# Patient Record
Sex: MALE | Race: WHITE | NOT HISPANIC OR LATINO
[De-identification: names, ages, dates, MRNs, and addresses within clinical notes are randomized per-mention and may not be internally consistent; named-entity substitution may affect disease eponyms.]

---

## 2019-01-29 ENCOUNTER — NON-APPOINTMENT (OUTPATIENT)
Age: 74
End: 2019-01-29

## 2019-01-29 ENCOUNTER — APPOINTMENT (OUTPATIENT)
Dept: CARDIOLOGY | Facility: CLINIC | Age: 74
End: 2019-01-29
Payer: MEDICARE

## 2019-01-29 VITALS — OXYGEN SATURATION: 96 % | SYSTOLIC BLOOD PRESSURE: 144 MMHG | DIASTOLIC BLOOD PRESSURE: 82 MMHG | HEART RATE: 91 BPM

## 2019-01-29 PROCEDURE — 99204 OFFICE O/P NEW MOD 45 MIN: CPT

## 2019-01-29 PROCEDURE — 93000 ELECTROCARDIOGRAM COMPLETE: CPT

## 2019-01-29 RX ORDER — METOPROLOL SUCCINATE 25 MG/1
25 TABLET, EXTENDED RELEASE ORAL
Refills: 0 | Status: ACTIVE | COMMUNITY

## 2019-01-29 RX ORDER — FINASTERIDE 5 MG/1
5 TABLET, FILM COATED ORAL
Refills: 0 | Status: ACTIVE | COMMUNITY

## 2019-01-29 RX ORDER — TADALAFIL 5 MG/1
5 TABLET, FILM COATED ORAL
Refills: 0 | Status: ACTIVE | COMMUNITY

## 2019-01-29 NOTE — DISCUSSION/SUMMARY
[FreeTextEntry1] : Pt is a 72 y/o M who presents today for evaluation.  He has PMH as follows:\par afib s/p ablation x2 last one 2013 done at [facility] with Dr Jacob\par echo 11/2016 normal LV function without significant valvular disease\par stress test 12/2016 non-diagnostic - target HR not achieved\par He also notes that he ahd 15% stenosis R sided carotid 10-15 yrs ago\par Will repeat TTE\par will perform ETT to assess patient's current cardiac reserve to incremental activity and check for provocable ECG changes.\par Repeat CUS\par HTN: well controlled, c/w current meds, advised ow salt diet, regular exercise, weight loss\par HLD: c/w statin. Advised lifestyle modifications\par Will try to get lab reports from PCP\par The described plan was discussed with the pt.  All questions and concerns were addressed to the best of my knowledge.

## 2019-01-29 NOTE — HISTORY OF PRESENT ILLNESS
[FreeTextEntry1] : Pt is a 72 y/o M who is referred here today by their PCP for evaluation.  He has PMH as follows:\par afib s/p ablation x2 last one 2013 done at [facility] with Dr Jacob\par echo 11/2016 normal LV function without significant valvular disease\par stress test 12/2016 non-diagnostic - target HR not achieved\par He also notes that he ahd 15% stenosis R sided carotid 10-15 yrs ago\par Tells me that he is feeling well overall and has no complaints.  He denies CP, SOB, diaphoresis, palpitations, dizziness, syncope, LE edema, PND. \par States that he is taking statin and diabetic medication (victoza) to help him lose weight - cannot not remember name of statin\par \par PCP: Dr Liang\par Works at a [employer]\par PMH: afib s/p ablation x2, HTN, HLD\par Smoking status: never\par Etoh socially\par no drug use\par Current exercise: none outside of work\par Daily water intake: 64oz\par Daily caffeine intake: 2 cups coffee\par OTC medications: none \par Family hx: father DM/CHF\par Previous cardiac testing: as mentioned\par Previous hospitalizations: ablation, splenectomy 1973

## 2019-01-29 NOTE — REASON FOR VISIT
[Consultation] : a consultation regarding [Atrial Fibrillation] : atrial fibrillation [Hyperlipidemia] : hyperlipidemia [Hypertension] : hypertension

## 2019-02-07 ENCOUNTER — APPOINTMENT (OUTPATIENT)
Dept: CARDIOLOGY | Facility: CLINIC | Age: 74
End: 2019-02-07
Payer: MEDICARE

## 2019-02-07 PROCEDURE — 93880 EXTRACRANIAL BILAT STUDY: CPT | Mod: 26

## 2019-02-11 ENCOUNTER — APPOINTMENT (OUTPATIENT)
Dept: CARDIOLOGY | Facility: CLINIC | Age: 74
End: 2019-02-11
Payer: MEDICARE

## 2019-02-11 PROCEDURE — 93306 TTE W/DOPPLER COMPLETE: CPT

## 2019-02-11 RX ADMIN — PERFLUTREN MG/ML: 6.52 INJECTION, SUSPENSION INTRAVENOUS at 00:00

## 2019-02-12 RX ORDER — PERFLUTREN 6.52 MG/ML
6.52 INJECTION, SUSPENSION INTRAVENOUS
Qty: 1 | Refills: 0 | Status: COMPLETED | OUTPATIENT
Start: 2019-02-11

## 2019-02-12 RX ORDER — ASPIRIN 81 MG
81 TABLET, DELAYED RELEASE (ENTERIC COATED) ORAL
Refills: 0 | Status: ACTIVE | COMMUNITY

## 2019-02-19 ENCOUNTER — APPOINTMENT (OUTPATIENT)
Dept: CARDIOLOGY | Facility: CLINIC | Age: 74
End: 2019-02-19

## 2019-02-25 ENCOUNTER — OTHER (OUTPATIENT)
Age: 74
End: 2019-02-25

## 2019-02-25 ENCOUNTER — APPOINTMENT (OUTPATIENT)
Dept: CARDIOLOGY | Facility: CLINIC | Age: 74
End: 2019-02-25
Payer: MEDICARE

## 2019-02-25 PROCEDURE — 93015 CV STRESS TEST SUPVJ I&R: CPT

## 2020-02-14 ENCOUNTER — NON-APPOINTMENT (OUTPATIENT)
Age: 75
End: 2020-02-14

## 2020-02-14 ENCOUNTER — APPOINTMENT (OUTPATIENT)
Dept: CARDIOLOGY | Facility: CLINIC | Age: 75
End: 2020-02-14
Payer: MEDICARE

## 2020-02-14 VITALS
WEIGHT: 308 LBS | BODY MASS INDEX: 36.37 KG/M2 | SYSTOLIC BLOOD PRESSURE: 120 MMHG | HEIGHT: 77 IN | HEART RATE: 73 BPM | OXYGEN SATURATION: 97 % | DIASTOLIC BLOOD PRESSURE: 72 MMHG

## 2020-02-14 PROCEDURE — 99213 OFFICE O/P EST LOW 20 MIN: CPT | Mod: 25

## 2020-02-14 PROCEDURE — 93000 ELECTROCARDIOGRAM COMPLETE: CPT

## 2020-02-15 NOTE — PHYSICAL EXAM
[Normal Appearance] : normal appearance [General Appearance - Well Developed] : well developed [No Deformities] : no deformities [Well Groomed] : well groomed [General Appearance - Well Nourished] : well nourished [Normal Conjunctiva] : the conjunctiva exhibited no abnormalities [General Appearance - In No Acute Distress] : no acute distress [No Oral Pallor] : no oral pallor [Eyelids - No Xanthelasma] : the eyelids demonstrated no xanthelasmas [Normal Oral Mucosa] : normal oral mucosa [No Oral Cyanosis] : no oral cyanosis [Respiration, Rhythm And Depth] : normal respiratory rhythm and effort [Exaggerated Use Of Accessory Muscles For Inspiration] : no accessory muscle use [Heart Sounds] : normal S1 and S2 [Heart Rate And Rhythm] : heart rate and rhythm were normal [Auscultation Breath Sounds / Voice Sounds] : lungs were clear to auscultation bilaterally [Arterial Pulses Normal] : the arterial pulses were normal [Edema] : no peripheral edema present [Murmurs] : no murmurs present [Abdomen Tenderness] : non-tender [Abdomen Soft] : soft [Abnormal Walk] : normal gait [Abdomen Mass (___ Cm)] : no abdominal mass palpated [Nail Clubbing] : no clubbing of the fingernails [Gait - Sufficient For Exercise Testing] : the gait was sufficient for exercise testing [Cyanosis, Localized] : no localized cyanosis [Petechial Hemorrhages (___cm)] : no petechial hemorrhages [Oriented To Time, Place, And Person] : oriented to person, place, and time [] : no ischemic changes [Impaired Insight] : insight and judgment were intact [Mood] : the mood was normal [Affect] : the affect was normal [No Anxiety] : not feeling anxious [FreeTextEntry1] : no JVD or bruits

## 2020-02-15 NOTE — DISCUSSION/SUMMARY
[FreeTextEntry1] : Pt is a 75 y/o M who is referred here today by their PCP for evaluation.  He has PMH as follows:\par afib s/p ablation x2 last one 2013 done at [facility] with Dr Jacob\par echo 11/2016 normal LV function without significant valvular disease\par TTE 02/2019 EF 60-65%, mild PI\par stress test 12/2016 non-diagnostic - target HR not achieved\par TTE 02/2019 excellent exercise tolerance, no ischemic changes\par CUS 02/2019 mild plaque b/l\par HTN: well controlled, c/w current meds, advised ow salt diet, regular exercise, weight loss\par HLD: c/w statin. Advised lifestyle modifications\par Will try to get lab reports from PCP\par The described plan was discussed with the pt.  All questions and concerns were addressed to the best of my knowledge.

## 2020-02-15 NOTE — HISTORY OF PRESENT ILLNESS
[FreeTextEntry1] : Pt is a 73 y/o M who is referred here today by their PCP for evaluation.  He has PMH as follows:\par afib s/p ablation x2 last one 2013 done at [facility] with Dr Jacob\par echo 11/2016 normal LV function without significant valvular disease\par TTE 02/2019 EF 60-65%, mild PI\par stress test 12/2016 non-diagnostic - target HR not achieved\par TTE 02/2019 excellent exercise tolerance, no ischemic changes\par CUS 02/2019 mild plaque b/l\par He also notes that he  had 15% stenosis R sided carotid 10-15 yrs ago\par Tells me that he is feeling well overall and has no complaints.  He denies CP, SOB, diaphoresis, palpitations, dizziness, syncope, LE edema, PND. \par States that he is taking statin and diabetic medication (victoza) to help him lose weight - cannot not remember name of statin\par \par PCP: Dr Liang\par Works at a [employer]\par PMH: afib s/p ablation x2, HTN, HLD\par Smoking status: never\par Etoh socially\par no drug use\par Current exercise: none outside of work\par Daily water intake: 64oz\par Daily caffeine intake: 2 cups coffee\par OTC medications: none \par Family hx: father DM/CHF\par Previous cardiac testing: as mentioned\par Previous hospitalizations: ablation, splenectomy 1973

## 2020-03-24 ENCOUNTER — NON-APPOINTMENT (OUTPATIENT)
Age: 75
End: 2020-03-24

## 2021-02-16 ENCOUNTER — NON-APPOINTMENT (OUTPATIENT)
Age: 76
End: 2021-02-16

## 2021-02-16 ENCOUNTER — APPOINTMENT (OUTPATIENT)
Dept: CARDIOLOGY | Facility: CLINIC | Age: 76
End: 2021-02-16
Payer: MEDICARE

## 2021-02-16 VITALS
HEART RATE: 85 BPM | SYSTOLIC BLOOD PRESSURE: 140 MMHG | WEIGHT: 315 LBS | BODY MASS INDEX: 37.19 KG/M2 | OXYGEN SATURATION: 96 % | DIASTOLIC BLOOD PRESSURE: 70 MMHG | HEIGHT: 77 IN

## 2021-02-16 DIAGNOSIS — Z98.890 OTHER SPECIFIED POSTPROCEDURAL STATES: ICD-10-CM

## 2021-02-16 PROCEDURE — 93000 ELECTROCARDIOGRAM COMPLETE: CPT

## 2021-02-16 PROCEDURE — 99213 OFFICE O/P EST LOW 20 MIN: CPT | Mod: 25

## 2021-02-16 RX ORDER — LATANOPROST/PF 0.005 %
0.01 DROPS OPHTHALMIC (EYE)
Qty: 2 | Refills: 0 | Status: ACTIVE | COMMUNITY
Start: 2021-02-03

## 2021-02-16 RX ORDER — POTASSIUM CHLORIDE 750 MG/1
10 TABLET, FILM COATED, EXTENDED RELEASE ORAL
Qty: 90 | Refills: 0 | Status: ACTIVE | COMMUNITY
Start: 2021-01-19

## 2021-02-16 RX ORDER — PEN NEEDLE, DIABETIC 32 GX 1/6"
32G X 4 MM NEEDLE, DISPOSABLE MISCELLANEOUS
Qty: 100 | Refills: 0 | Status: ACTIVE | COMMUNITY
Start: 2021-01-12

## 2021-02-16 RX ORDER — LIRAGLUTIDE 6 MG/ML
18 INJECTION SUBCUTANEOUS
Refills: 0 | Status: DISCONTINUED | COMMUNITY
Start: 2020-02-14 | End: 2021-02-16

## 2021-02-16 RX ORDER — DORZOLAMIDE HYDROCHLORIDE TIMOLOL MALEATE 20; 5 MG/ML; MG/ML
22.3-6.8 SOLUTION/ DROPS OPHTHALMIC
Qty: 10 | Refills: 0 | Status: ACTIVE | COMMUNITY
Start: 2020-10-24

## 2021-02-16 NOTE — DISCUSSION/SUMMARY
[FreeTextEntry1] : Pt is a 74 y/o M PMH afib s/p ablation x2 last one 2013 done at [facility] with Dr Jacob, mild b/l carotid plaque\par Tells me that he is feeling well overall and has no complaints.  He denies CP, SOB, diaphoresis, palpitations, dizziness, syncope, LE edema, PND, orthopnea. \par \par TTE 11/2016 normal LV function without significant valvular disease\par TTE 02/2019 EF 60-65%, mild PI\par stress test 12/2016 non-diagnostic - target HR not achieved\par ETT 02/2019 excellent exercise tolerance, no ischemic changes\par CUS 02/2019 mild plaque b/l\par \par HTN: well controlled, c/w current meds, advised ow salt diet, regular exercise, weight loss\par HLD: not currently on a statin - will try to get most recent lab results\par Will try to get lab reports from PCP\par Pt will return in 12 mnths or sooner as needed but I encouraged communication via phone or portal if necessary. \par The described plan was discussed with the pt.  All questions and concerns were addressed to the best of my knowledge.

## 2021-02-16 NOTE — HISTORY OF PRESENT ILLNESS
[FreeTextEntry1] : Pt is a 76 y/o M PMH afib s/p ablation x2 last one 2013 done at [facility] with Dr Jacob, mild b/l carotid plaque\par Tells me that he is feeling well overall and has no complaints.  He denies CP, SOB, diaphoresis, palpitations, dizziness, syncope, LE edema, PND, orthopnea. \par \par TTE 11/2016 normal LV function without significant valvular disease\par TTE 02/2019 EF 60-65%, mild PI\par stress test 12/2016 non-diagnostic - target HR not achieved\par ETT 02/2019 excellent exercise tolerance, no ischemic changes\par CUS 02/2019 mild plaque b/l\par \par COVID vaccine 02/2021\par \par PCP: Dr Liang\par Works at a [employer]\par PMH: afib s/p ablation x2, HTN, HLD\par Smoking status: never\par Etoh socially\par no drug use\par Current exercise: none outside of work\par Daily water intake: 64oz\par Daily caffeine intake: 2 cups coffee\par OTC medications: none \par Family hx: father DM/CHF\par Previous cardiac testing: as mentioned\par Previous hospitalizations: ablation, splenectomy 1973

## 2021-02-16 NOTE — REVIEW OF SYSTEMS
[see HPI] : see HPI [Negative] : Heme/Lymph [Shortness Of Breath] : no shortness of breath [Dyspnea on exertion] : not dyspnea during exertion [Chest  Pressure] : no chest pressure [Chest Pain] : no chest pain [Leg Claudication] : no intermittent leg claudication [Lower Ext Edema] : no extremity edema [Palpitations] : no palpitations

## 2021-02-16 NOTE — PHYSICAL EXAM
[General Appearance - Well Developed] : well developed [Normal Appearance] : normal appearance [Well Groomed] : well groomed [General Appearance - Well Nourished] : well nourished [No Deformities] : no deformities [General Appearance - In No Acute Distress] : no acute distress [Normal Conjunctiva] : the conjunctiva exhibited no abnormalities [Eyelids - No Xanthelasma] : the eyelids demonstrated no xanthelasmas [Normal Oral Mucosa] : normal oral mucosa [No Oral Pallor] : no oral pallor [No Oral Cyanosis] : no oral cyanosis [Respiration, Rhythm And Depth] : normal respiratory rhythm and effort [Exaggerated Use Of Accessory Muscles For Inspiration] : no accessory muscle use [Auscultation Breath Sounds / Voice Sounds] : lungs were clear to auscultation bilaterally [Heart Rate And Rhythm] : heart rate and rhythm were normal [Heart Sounds] : normal S1 and S2 [Murmurs] : no murmurs present [Arterial Pulses Normal] : the arterial pulses were normal [Edema] : no peripheral edema present [Abdomen Soft] : soft [Abdomen Tenderness] : non-tender [Abdomen Mass (___ Cm)] : no abdominal mass palpated [Abnormal Walk] : normal gait [Gait - Sufficient For Exercise Testing] : the gait was sufficient for exercise testing [Nail Clubbing] : no clubbing of the fingernails [Cyanosis, Localized] : no localized cyanosis [Petechial Hemorrhages (___cm)] : no petechial hemorrhages [] : no ischemic changes [Oriented To Time, Place, And Person] : oriented to person, place, and time [Affect] : the affect was normal [Impaired Insight] : insight and judgment were intact [Mood] : the mood was normal [No Anxiety] : not feeling anxious [FreeTextEntry1] : no JVD or bruits

## 2022-05-10 ENCOUNTER — NON-APPOINTMENT (OUTPATIENT)
Age: 77
End: 2022-05-10

## 2022-09-06 ENCOUNTER — APPOINTMENT (OUTPATIENT)
Dept: CARDIOLOGY | Facility: CLINIC | Age: 77
End: 2022-09-06

## 2022-09-06 ENCOUNTER — NON-APPOINTMENT (OUTPATIENT)
Age: 77
End: 2022-09-06

## 2022-09-06 VITALS
BODY MASS INDEX: 37.19 KG/M2 | HEIGHT: 77 IN | HEART RATE: 74 BPM | SYSTOLIC BLOOD PRESSURE: 142 MMHG | DIASTOLIC BLOOD PRESSURE: 76 MMHG | WEIGHT: 315 LBS

## 2022-09-06 DIAGNOSIS — I65.21 OCCLUSION AND STENOSIS OF RIGHT CAROTID ARTERY: ICD-10-CM

## 2022-09-06 PROCEDURE — 99214 OFFICE O/P EST MOD 30 MIN: CPT | Mod: 25

## 2022-09-06 NOTE — HISTORY OF PRESENT ILLNESS
[FreeTextEntry1] : Pt is a 75 y/o M PMH afib s/p ablation x2 last one 2013 done at [facility] with Dr Jacob on ASA, HTN, HLD, mild b/l carotid plaque.  He tells me that he went to the eye Dr recently who was concerned about carotid stenosis\par Pt reports feeling well and has no active cardiac complaints - denies CP, SOB, palpitations, dizziness, syncope, edema, orthopnea, PND, orthopnea.  No exertional symptoms. \par \par TTE 11/2016 normal LV function without significant valvular disease\par TTE 02/2019 EF 60-65%, mild PI\par stress test 12/2016 non-diagnostic - target HR not achieved\par ETT 02/2019 excellent exercise tolerance, no ischemic changes\par CUS 02/2019 mild plaque b/l\par \par COVID vaccine 02/2021\par \par PCP: Dr Liang\par Works at a nursery\par PMH: afib s/p ablation x2, HTN, HLD\par Smoking status: never\par Etoh socially\par no drug use\par Current exercise: none outside of work\par Daily water intake: 64oz\par Daily caffeine intake: 2 cups coffee\par OTC medications: none \par Family hx: father DM/CHF\par Previous hospitalizations: ablation, splenectomy 1973

## 2022-09-06 NOTE — DISCUSSION/SUMMARY
[FreeTextEntry1] : Pt is a 77 y/o M PMH afib s/p ablation x2 last one 2013 done at [facility] with Dr Jacob, HTN, HLD, mild b/l carotid plaque\par \par TTE 11/2016 normal LV function without significant valvular disease\par TTE 02/2019 EF 60-65%, mild PI\par stress test 12/2016 non-diagnostic - target HR not achieved\par ETT 02/2019 excellent exercise tolerance, no ischemic changes\par CUS 02/2019 mild plaque b/l\par \par AF:\par pt very symptomatic with his episodes and has been feeling well since his last ablation\par c/w ASA\par monitor closely\par \par ROBERT:\par repeat CUS\par c/w ASA\par will try to gert copy of most recent chol levels\par \par HTN: \par well controlled, \par c/w current meds, \par advised ow salt diet, regular exercise, weight loss\par \par HLD: \par not currently on a statin - will try to get most recent lab results\par Will try to get lab reports from PCP\par \par Pt will return in 6 mnths or sooner as needed but I encouraged communication via phone or portal if necessary. \par The described plan was discussed with the pt.  All questions and concerns were addressed to the best of my knowledge.

## 2022-09-08 ENCOUNTER — APPOINTMENT (OUTPATIENT)
Dept: CARDIOLOGY | Facility: CLINIC | Age: 77
End: 2022-09-08

## 2022-09-08 PROCEDURE — 93880 EXTRACRANIAL BILAT STUDY: CPT | Mod: 26

## 2022-10-13 ENCOUNTER — NON-APPOINTMENT (OUTPATIENT)
Age: 77
End: 2022-10-13

## 2022-11-10 ENCOUNTER — APPOINTMENT (OUTPATIENT)
Dept: CARDIOLOGY | Facility: CLINIC | Age: 77
End: 2022-11-10

## 2023-04-06 ENCOUNTER — APPOINTMENT (OUTPATIENT)
Dept: CARDIOLOGY | Facility: CLINIC | Age: 78
End: 2023-04-06
Payer: MEDICARE

## 2023-04-06 ENCOUNTER — NON-APPOINTMENT (OUTPATIENT)
Age: 78
End: 2023-04-06

## 2023-04-06 VITALS
HEIGHT: 77 IN | WEIGHT: 315 LBS | DIASTOLIC BLOOD PRESSURE: 74 MMHG | OXYGEN SATURATION: 97 % | SYSTOLIC BLOOD PRESSURE: 128 MMHG | BODY MASS INDEX: 37.19 KG/M2 | HEART RATE: 73 BPM

## 2023-04-06 PROCEDURE — 99214 OFFICE O/P EST MOD 30 MIN: CPT | Mod: 25

## 2023-04-06 PROCEDURE — 93000 ELECTROCARDIOGRAM COMPLETE: CPT

## 2023-04-06 NOTE — DISCUSSION/SUMMARY
[FreeTextEntry1] : Pt is a 76 y/o M PMH afib s/p ablation x2 last one 2013 done at [facility] with Dr Jacob, HTN, HLD, mild b/l carotid plaque\par \par TTE 11/2016 normal LV function without significant valvular disease\par TTE 02/2019 EF 60-65%, mild PI\par stress test 12/2016 non-diagnostic - target HR not achieved\par ETT 02/2019 excellent exercise tolerance, no ischemic changes\par CUS 02/2019 mild plaque b/l\par \par AF:\par pt very symptomatic with his episodes and has been feeling well since his last ablation\par c/w ASA\par monitor closely\par \par ROBERT:\par c/w ASA\par will try to get copy of most recent chol levels\par \par HTN: \par well controlled, \par c/w current meds, \par advised ow salt diet, regular exercise, weight loss\par \par HLD: \par not currently on a statin - will try to get most recent lab results\par Will try to get lab reports from PCP\par \par Pt will return in 12 mnths or sooner as needed but I encouraged communication via phone or portal if necessary. \par The described plan was discussed with the pt.  All questions and concerns were addressed to the best of my knowledge.

## 2023-04-06 NOTE — HISTORY OF PRESENT ILLNESS
[FreeTextEntry1] : Pt is a 76 y/o M PMH afib s/p ablation x2 last one 2013 done at [facility] with Dr Jacob on ASA, HTN, HLD, mild b/l carotid plaque. \par Pt reports feeling well and has no active cardiac complaints - denies CP, SOB, palpitations, dizziness, syncope, edema, orthopnea, PND, orthopnea.  No exertional symptoms. \par \par TTE 11/2016 normal LV function without significant valvular disease\par TTE 02/2019 EF 60-65%, mild PI\par stress test 12/2016 non-diagnostic - target HR not achieved\par ETT 02/2019 excellent exercise tolerance, no ischemic changes\par CUS 02/2019 mild plaque b/l\par CUS 09/2022 mild atherosclerosis with normal velocities\par \par PCP: Dr Liang\par Works at a [employer]\par PMH: afib s/p ablation x2, HTN, HLD\par Smoking status: never\par Etoh socially\par no drug use\par Current exercise: none outside of work\par Daily water intake: 64oz\par Daily caffeine intake: 2 cups coffee\par OTC medications: none \par Family hx: father DM/CHF\par Previous hospitalizations: ablation, splenectomy 1973

## 2023-12-05 ENCOUNTER — APPOINTMENT (OUTPATIENT)
Dept: CARDIOLOGY | Facility: CLINIC | Age: 78
End: 2023-12-05
Payer: MEDICARE

## 2023-12-05 ENCOUNTER — NON-APPOINTMENT (OUTPATIENT)
Age: 78
End: 2023-12-05

## 2023-12-05 VITALS
SYSTOLIC BLOOD PRESSURE: 142 MMHG | HEART RATE: 71 BPM | BODY MASS INDEX: 37.19 KG/M2 | WEIGHT: 315 LBS | DIASTOLIC BLOOD PRESSURE: 76 MMHG | HEIGHT: 77 IN | OXYGEN SATURATION: 93 %

## 2023-12-05 DIAGNOSIS — M79.89 OTHER SPECIFIED SOFT TISSUE DISORDERS: ICD-10-CM

## 2023-12-05 PROCEDURE — 99214 OFFICE O/P EST MOD 30 MIN: CPT | Mod: 25

## 2023-12-05 PROCEDURE — 93000 ELECTROCARDIOGRAM COMPLETE: CPT

## 2023-12-22 ENCOUNTER — APPOINTMENT (OUTPATIENT)
Dept: CARDIOLOGY | Facility: CLINIC | Age: 78
End: 2023-12-22
Payer: MEDICARE

## 2023-12-22 PROCEDURE — 93306 TTE W/DOPPLER COMPLETE: CPT

## 2024-04-08 ENCOUNTER — NON-APPOINTMENT (OUTPATIENT)
Age: 79
End: 2024-04-08

## 2024-04-08 ENCOUNTER — APPOINTMENT (OUTPATIENT)
Dept: CARDIOLOGY | Facility: CLINIC | Age: 79
End: 2024-04-08
Payer: MEDICARE

## 2024-04-08 VITALS
BODY MASS INDEX: 36.6 KG/M2 | HEIGHT: 77 IN | OXYGEN SATURATION: 96 % | WEIGHT: 310 LBS | DIASTOLIC BLOOD PRESSURE: 62 MMHG | SYSTOLIC BLOOD PRESSURE: 120 MMHG | HEART RATE: 107 BPM

## 2024-04-08 DIAGNOSIS — I10 ESSENTIAL (PRIMARY) HYPERTENSION: ICD-10-CM

## 2024-04-08 DIAGNOSIS — I48.0 PAROXYSMAL ATRIAL FIBRILLATION: ICD-10-CM

## 2024-04-08 DIAGNOSIS — E78.5 HYPERLIPIDEMIA, UNSPECIFIED: ICD-10-CM

## 2024-04-08 PROCEDURE — 99214 OFFICE O/P EST MOD 30 MIN: CPT | Mod: 25

## 2024-04-08 PROCEDURE — 93242 EXT ECG>48HR<7D RECORDING: CPT

## 2024-04-08 PROCEDURE — 93000 ELECTROCARDIOGRAM COMPLETE: CPT | Mod: 59

## 2024-04-08 RX ORDER — APIXABAN 5 MG/1
5 TABLET, FILM COATED ORAL
Qty: 180 | Refills: 3 | Status: ACTIVE | COMMUNITY
Start: 2019-02-25 | End: 1900-01-01

## 2024-04-08 NOTE — PHYSICAL EXAM

## 2024-04-08 NOTE — DISCUSSION/SUMMARY
[EKG obtained to assist in diagnosis and management of assessed problem(s)] : EKG obtained to assist in diagnosis and management of assessed problem(s) [FreeTextEntry1] : Pt is a 79 y/o M PMH afib s/p ablation x2 last one 2013 done at [facility] with Dr Jacob, HTN, HLD, mild b/l carotid plaque  TTE 11/2016 normal LV function without significant valvular disease TTE 02/2019 EF 60-65%, mild PI TTE 12/2023 EF 55-60%, trace MR/TR stress test 12/2016 non-diagnostic - target HR not achieved ETT 02/2019 excellent exercise tolerance, no ischemic changes CUS 02/2019 mild plaque b/l CUS 09/2022 mild atherosclerosis with normal velocities  AF: ECG shows AF today CHADSVasc = 3 start Eliquis 5mg bid stop ASA  check cueva check CCTA  monitor closely  ROBERT: mild plaque will try to get copy of most recent chol levels  HTN:  well controlled c/w current meds  advised ow salt diet, regular exercise, weight loss  HLD:  not currently on a statin - will try to get most recent lab results Will try to get lab reports from PCP  Pt will return in 6 mnths or sooner as needed but I encouraged communication via phone or portal if necessary.  The described plan was discussed with the pt.  All questions and concerns were addressed to the best of my knowledge.

## 2024-04-08 NOTE — HISTORY OF PRESENT ILLNESS
[FreeTextEntry1] : Pt is a 77 y/o M PMH afib s/p ablation x2 last one 2013 done at [facility] with Dr Jacob on ASA, HTN, HLD, mild b/l carotid plaque.   Pt reports feeling well and has no active cardiac complaints - denies CP, SOB, palpitations, dizziness, syncope, edema, orthopnea, PND, orthopnea.  No exertional symptoms. No new hospitalizations, emergency room/urgent care visits, new medical diagnoses, new medications, surgery, or cardiac testing since last OV.   TTE 11/2016 normal LV function without significant valvular disease TTE 02/2019 EF 60-65%, mild PI TTE 12/2023 EF 55-60%, trace MR/TR stress test 12/2016 non-diagnostic - target HR not achieved ETT 02/2019 excellent exercise tolerance, no ischemic changes CUS 02/2019 mild plaque b/l CUS 09/2022 mild atherosclerosis with normal velocities  PCP: Dr Liang Works at a [employer] PMH: afib s/p ablation x2, HTN, HLD Smoking status: never Etoh socially no drug use Current exercise: none outside of work Daily water intake: 64oz Daily caffeine intake: 2 cups coffee OTC medications: none  Family hx: father DM/CHF Previous hospitalizations: ablation, splenectomy 1973

## 2024-04-16 DIAGNOSIS — R94.31 ABNORMAL ELECTROCARDIOGRAM [ECG] [EKG]: ICD-10-CM

## 2024-04-18 ENCOUNTER — APPOINTMENT (OUTPATIENT)
Dept: CARDIOLOGY | Facility: CLINIC | Age: 79
End: 2024-04-18
Payer: MEDICARE

## 2024-04-18 PROCEDURE — 93018 CV STRESS TEST I&R ONLY: CPT

## 2024-04-18 PROCEDURE — A9500: CPT

## 2024-04-18 PROCEDURE — 78452 HT MUSCLE IMAGE SPECT MULT: CPT

## 2024-04-18 PROCEDURE — 93017 CV STRESS TEST TRACING ONLY: CPT

## 2024-04-18 PROCEDURE — 93016 CV STRESS TEST SUPVJ ONLY: CPT

## 2024-04-24 PROCEDURE — 93244 EXT ECG>48HR<7D REV&INTERPJ: CPT

## 2024-10-21 ENCOUNTER — APPOINTMENT (OUTPATIENT)
Dept: CARDIOLOGY | Facility: CLINIC | Age: 79
End: 2024-10-21
Payer: MEDICARE

## 2024-10-21 ENCOUNTER — NON-APPOINTMENT (OUTPATIENT)
Age: 79
End: 2024-10-21

## 2024-10-21 VITALS
HEIGHT: 77 IN | WEIGHT: 310 LBS | BODY MASS INDEX: 36.6 KG/M2 | OXYGEN SATURATION: 97 % | HEART RATE: 77 BPM | SYSTOLIC BLOOD PRESSURE: 106 MMHG | DIASTOLIC BLOOD PRESSURE: 66 MMHG

## 2024-10-21 DIAGNOSIS — I48.0 PAROXYSMAL ATRIAL FIBRILLATION: ICD-10-CM

## 2024-10-21 DIAGNOSIS — E78.5 HYPERLIPIDEMIA, UNSPECIFIED: ICD-10-CM

## 2024-10-21 DIAGNOSIS — I10 ESSENTIAL (PRIMARY) HYPERTENSION: ICD-10-CM

## 2024-10-21 PROCEDURE — 99214 OFFICE O/P EST MOD 30 MIN: CPT | Mod: 25

## 2024-10-21 PROCEDURE — 93000 ELECTROCARDIOGRAM COMPLETE: CPT

## 2024-10-29 RX ORDER — ATORVASTATIN CALCIUM 10 MG/1
10 TABLET, FILM COATED ORAL
Refills: 0 | Status: ACTIVE | COMMUNITY
Start: 2024-10-29

## 2025-03-18 ENCOUNTER — OUTPATIENT (OUTPATIENT)
Dept: OUTPATIENT SERVICES | Facility: HOSPITAL | Age: 80
LOS: 1 days | End: 2025-03-18
Payer: MEDICARE

## 2025-03-18 ENCOUNTER — APPOINTMENT (OUTPATIENT)
Dept: CT IMAGING | Facility: CLINIC | Age: 80
End: 2025-03-18
Payer: MEDICARE

## 2025-03-18 DIAGNOSIS — Z00.8 ENCOUNTER FOR OTHER GENERAL EXAMINATION: ICD-10-CM

## 2025-03-18 DIAGNOSIS — R19.7 DIARRHEA, UNSPECIFIED: ICD-10-CM

## 2025-03-18 PROCEDURE — 74177 CT ABD & PELVIS W/CONTRAST: CPT | Mod: MH

## 2025-03-18 PROCEDURE — 74177 CT ABD & PELVIS W/CONTRAST: CPT | Mod: 26

## 2025-04-28 ENCOUNTER — APPOINTMENT (OUTPATIENT)
Dept: CARDIOLOGY | Facility: CLINIC | Age: 80
End: 2025-04-28